# Patient Record
Sex: FEMALE | Race: BLACK OR AFRICAN AMERICAN | HISPANIC OR LATINO | Employment: FULL TIME | RURAL
[De-identification: names, ages, dates, MRNs, and addresses within clinical notes are randomized per-mention and may not be internally consistent; named-entity substitution may affect disease eponyms.]

---

## 2022-10-02 ENCOUNTER — HOSPITAL ENCOUNTER (EMERGENCY)
Facility: HOSPITAL | Age: 32
Discharge: HOME | End: 2022-10-02
Payer: COMMERCIAL

## 2022-10-02 VITALS
HEIGHT: 60 IN | OXYGEN SATURATION: 98 % | SYSTOLIC BLOOD PRESSURE: 135 MMHG | RESPIRATION RATE: 20 BRPM | TEMPERATURE: 98 F | HEART RATE: 69 BPM | BODY MASS INDEX: 27.12 KG/M2 | DIASTOLIC BLOOD PRESSURE: 86 MMHG | WEIGHT: 138.13 LBS

## 2022-10-02 DIAGNOSIS — R10.9 RIGHT FLANK PAIN: ICD-10-CM

## 2022-10-02 DIAGNOSIS — N20.0 KIDNEY STONE: Primary | ICD-10-CM

## 2022-10-02 DIAGNOSIS — N13.30 HYDRONEPHROSIS, UNSPECIFIED HYDRONEPHROSIS TYPE: ICD-10-CM

## 2022-10-02 LAB
ALBUMIN SERPL BCP-MCNC: 3.7 G/DL (ref 3.5–5)
ALBUMIN/GLOB SERPL: 1.2 {RATIO}
ALP SERPL-CCNC: 108 U/L (ref 37–98)
ALT SERPL W P-5'-P-CCNC: 33 U/L (ref 13–56)
AMYLASE SERPL-CCNC: 65 U/L (ref 25–115)
ANION GAP SERPL CALCULATED.3IONS-SCNC: 14 MMOL/L (ref 7–16)
AST SERPL W P-5'-P-CCNC: 21 U/L (ref 15–37)
BASOPHILS # BLD AUTO: 0.04 K/UL (ref 0–0.2)
BASOPHILS NFR BLD AUTO: 0.3 % (ref 0–1)
BILIRUB SERPL-MCNC: 0.3 MG/DL (ref ?–1.2)
BILIRUB UR QL STRIP: NEGATIVE
BUN SERPL-MCNC: 9 MG/DL (ref 7–18)
BUN/CREAT SERPL: 11 (ref 6–20)
CALCIUM SERPL-MCNC: 8.2 MG/DL (ref 8.5–10.1)
CHLORIDE SERPL-SCNC: 102 MMOL/L (ref 98–107)
CLARITY UR: CLEAR
CO2 SERPL-SCNC: 27 MMOL/L (ref 21–32)
COLOR UR: YELLOW
CREAT SERPL-MCNC: 0.83 MG/DL (ref 0.55–1.02)
DIFFERENTIAL METHOD BLD: ABNORMAL
EGFR (NO RACE VARIABLE) (RUSH/TITUS): 97 ML/MIN/1.73M²
EOSINOPHIL # BLD AUTO: 0.12 K/UL (ref 0–0.5)
EOSINOPHIL NFR BLD AUTO: 0.9 % (ref 1–4)
ERYTHROCYTE [DISTWIDTH] IN BLOOD BY AUTOMATED COUNT: 11.8 % (ref 11.5–14.5)
ETHANOL, BLOOD (CATEGORY): NOT DETECTED
GLOBULIN SER-MCNC: 3.2 G/DL (ref 2–4)
GLUCOSE SERPL-MCNC: 113 MG/DL (ref 74–106)
GLUCOSE UR STRIP-MCNC: NEGATIVE MG/DL
HCG UR QL IA.RAPID: NEGATIVE
HCT VFR BLD AUTO: 40.2 % (ref 38–47)
HGB BLD-MCNC: 13.9 G/DL (ref 12–16)
KETONES UR STRIP-SCNC: NEGATIVE MG/DL
LEUKOCYTE ESTERASE UR QL STRIP: ABNORMAL
LIPASE SERPL-CCNC: 100 U/L (ref 73–393)
LYMPHOCYTES # BLD AUTO: 2.53 K/UL (ref 1–4.8)
LYMPHOCYTES NFR BLD AUTO: 18.9 % (ref 27–41)
MCH RBC QN AUTO: 32.3 PG (ref 27–31)
MCHC RBC AUTO-ENTMCNC: 34.6 G/DL (ref 32–36)
MCV RBC AUTO: 93.5 FL (ref 80–96)
MONOCYTES # BLD AUTO: 1.12 K/UL (ref 0–0.8)
MONOCYTES NFR BLD AUTO: 8.4 % (ref 2–6)
MPC BLD CALC-MCNC: 8.9 FL (ref 9.4–12.4)
NEUTROPHILS # BLD AUTO: 9.58 K/UL (ref 1.8–7.7)
NEUTROPHILS NFR BLD AUTO: 71.5 % (ref 53–65)
NITRITE UR QL STRIP: NEGATIVE
PH UR STRIP: 7 PH UNITS
PLATELET # BLD AUTO: 357 K/UL (ref 150–400)
POTASSIUM SERPL-SCNC: 3.6 MMOL/L (ref 3.5–5.1)
PROT SERPL-MCNC: 6.9 G/DL (ref 6.4–8.2)
PROT UR QL STRIP: 30
RBC # BLD AUTO: 4.3 M/UL (ref 4.2–5.4)
RBC # UR STRIP: ABNORMAL /UL
RBC #/AREA URNS HPF: ABNORMAL /HPF
SODIUM SERPL-SCNC: 139 MMOL/L (ref 136–145)
SP GR UR STRIP: 1.02
UROBILINOGEN UR STRIP-ACNC: 0.2 MG/DL
WBC # BLD AUTO: 13.39 K/UL (ref 4.5–11)
WBC #/AREA URNS HPF: ABNORMAL /HPF

## 2022-10-02 PROCEDURE — 36415 COLL VENOUS BLD VENIPUNCTURE: CPT

## 2022-10-02 PROCEDURE — 99284 EMERGENCY DEPT VISIT MOD MDM: CPT | Mod: ,,,

## 2022-10-02 PROCEDURE — 82077 ASSAY SPEC XCP UR&BREATH IA: CPT

## 2022-10-02 PROCEDURE — 96374 THER/PROPH/DIAG INJ IV PUSH: CPT

## 2022-10-02 PROCEDURE — 99284 PR EMERGENCY DEPT VISIT,LEVEL IV: ICD-10-PCS | Mod: ,,,

## 2022-10-02 PROCEDURE — 82150 ASSAY OF AMYLASE: CPT

## 2022-10-02 PROCEDURE — 85025 COMPLETE CBC W/AUTO DIFF WBC: CPT

## 2022-10-02 PROCEDURE — 80053 COMPREHEN METABOLIC PANEL: CPT

## 2022-10-02 PROCEDURE — 81025 URINE PREGNANCY TEST: CPT

## 2022-10-02 PROCEDURE — 25000003 PHARM REV CODE 250

## 2022-10-02 PROCEDURE — 96375 TX/PRO/DX INJ NEW DRUG ADDON: CPT

## 2022-10-02 PROCEDURE — 81001 URINALYSIS AUTO W/SCOPE: CPT

## 2022-10-02 PROCEDURE — 83690 ASSAY OF LIPASE: CPT

## 2022-10-02 PROCEDURE — 63600175 PHARM REV CODE 636 W HCPCS

## 2022-10-02 PROCEDURE — 99285 EMERGENCY DEPT VISIT HI MDM: CPT | Mod: 25

## 2022-10-02 RX ORDER — HYDROCODONE BITARTRATE AND ACETAMINOPHEN 5; 325 MG/1; MG/1
1 TABLET ORAL EVERY 4 HOURS PRN
Qty: 8 TABLET | Refills: 0 | Status: SHIPPED | OUTPATIENT
Start: 2022-10-02

## 2022-10-02 RX ORDER — KETOROLAC TROMETHAMINE 30 MG/ML
30 INJECTION, SOLUTION INTRAMUSCULAR; INTRAVENOUS ONCE
Status: COMPLETED | OUTPATIENT
Start: 2022-10-02 | End: 2022-10-02

## 2022-10-02 RX ORDER — KETOROLAC TROMETHAMINE 10 MG/1
10 TABLET, FILM COATED ORAL EVERY 6 HOURS
Qty: 20 TABLET | Refills: 0 | Status: SHIPPED | OUTPATIENT
Start: 2022-10-02 | End: 2022-10-07

## 2022-10-02 RX ORDER — ONDANSETRON 2 MG/ML
4 INJECTION INTRAMUSCULAR; INTRAVENOUS ONCE
Status: COMPLETED | OUTPATIENT
Start: 2022-10-02 | End: 2022-10-02

## 2022-10-02 RX ORDER — TAMSULOSIN HYDROCHLORIDE 0.4 MG/1
0.4 CAPSULE ORAL DAILY
Qty: 10 CAPSULE | Refills: 0 | Status: SHIPPED | OUTPATIENT
Start: 2022-10-02 | End: 2022-10-12

## 2022-10-02 RX ORDER — ONDANSETRON 4 MG/1
4 TABLET, FILM COATED ORAL EVERY 6 HOURS
Qty: 12 TABLET | Refills: 0 | Status: SHIPPED | OUTPATIENT
Start: 2022-10-02

## 2022-10-02 RX ADMIN — KETOROLAC TROMETHAMINE 30 MG: 30 INJECTION, SOLUTION INTRAMUSCULAR at 11:10

## 2022-10-02 RX ADMIN — ONDANSETRON 4 MG: 2 INJECTION INTRAMUSCULAR; INTRAVENOUS at 11:10

## 2022-10-02 RX ADMIN — SODIUM CHLORIDE 1000 ML: 9 INJECTION, SOLUTION INTRAVENOUS at 11:10

## 2022-10-02 NOTE — Clinical Note
"Suemills "Lacey" Mayo was seen and treated in our emergency department on 10/2/2022.  She may return to work on 10/03/2022.       If you have any questions or concerns, please don't hesitate to call.      CHARLOTTE Cartagena"

## 2022-10-02 NOTE — DISCHARGE INSTRUCTIONS
Take medication as directed, drink plenty of water, follow-up PCP as needed, and return to the ER if experiencing chest pain shortness of breath.

## 2022-10-02 NOTE — ED PROVIDER NOTES
Encounter Date: 10/2/2022       History     Chief Complaint   Patient presents with    Flank Pain     Patient is a 33 y/o  female who presents to the Emergency Department with c/o right sided flank pain that started this morning upon awaking. Patient denies any nausea, vomiting, and/or diarrhea..    The history is provided by the patient.   Flank Pain  This is a new problem. The current episode started 1 to 2 hours ago. The problem occurs constantly. The problem has not changed since onset.Associated symptoms include abdominal pain. Pertinent negatives include no chest pain, no headaches and no shortness of breath. The symptoms are aggravated by walking, twisting and exertion. Nothing relieves the symptoms. She has tried nothing for the symptoms. The treatment provided no relief.   Review of patient's allergies indicates:  No Known Allergies  History reviewed. No pertinent past medical history.  History reviewed. No pertinent surgical history.  History reviewed. No pertinent family history.  Social History     Tobacco Use    Smoking status: Some Days     Types: Cigarettes     Passive exposure: Current    Smokeless tobacco: Never   Substance Use Topics    Alcohol use: Yes    Drug use: Never     Review of Systems   Constitutional: Negative.    HENT: Negative.     Eyes: Negative.  Negative for discharge.   Respiratory:  Negative for apnea, cough, choking, chest tightness, shortness of breath, wheezing and stridor.    Cardiovascular:  Negative for chest pain.   Gastrointestinal:  Positive for abdominal pain.   Endocrine: Negative.    Genitourinary:  Positive for flank pain.   Skin: Negative.    Allergic/Immunologic: Negative.    Neurological: Negative.  Negative for headaches.   Hematological: Negative.    Psychiatric/Behavioral: Negative.       Physical Exam     Initial Vitals [10/02/22 1107]   BP Pulse Resp Temp SpO2   135/86 69 20 98 °F (36.7 °C) 98 %      MAP       --         Physical Exam    Constitutional:  She appears well-developed and well-nourished. She is not diaphoretic. No distress.   HENT:   Head: Normocephalic and atraumatic.   Right Ear: External ear normal.   Left Ear: External ear normal.   Nose: Nose normal.   Mouth/Throat: Oropharynx is clear and moist. No oropharyngeal exudate.   Eyes: Conjunctivae and EOM are normal. Pupils are equal, round, and reactive to light. Right eye exhibits no discharge. Left eye exhibits no discharge. No scleral icterus.   Neck: Neck supple. No thyromegaly present. No tracheal deviation present. No JVD present.   Normal range of motion.  Cardiovascular:  Normal rate, regular rhythm, normal heart sounds and intact distal pulses.     Exam reveals no gallop and no friction rub.       No murmur heard.  Pulmonary/Chest: Breath sounds normal. No stridor. No respiratory distress. She has no wheezes. She has no rhonchi. She has no rales. She exhibits no tenderness.   Abdominal: Abdomen is soft. Bowel sounds are normal. She exhibits no distension and no mass. There is abdominal tenderness. There is no rebound and no guarding.   Musculoskeletal:         General: No tenderness or edema. Normal range of motion.      Cervical back: Normal range of motion and neck supple.     Lymphadenopathy:     She has no cervical adenopathy.   Neurological: She is alert and oriented to person, place, and time. She has normal strength and normal reflexes. She displays normal reflexes. No cranial nerve deficit or sensory deficit. GCS score is 15. GCS eye subscore is 4. GCS verbal subscore is 5. GCS motor subscore is 6.   Skin: Skin is warm and dry. Capillary refill takes less than 2 seconds. No rash and no abscess noted. No erythema. No pallor.   Psychiatric: She has a normal mood and affect. Her behavior is normal. Judgment and thought content normal.       Medical Screening Exam   See Full Note    ED Course   Procedures  Labs Reviewed   COMPREHENSIVE METABOLIC PANEL - Abnormal; Notable for the following  components:       Result Value    Glucose 113 (*)     Calcium 8.2 (*)     Alk Phos 108 (*)     All other components within normal limits   URINALYSIS, REFLEX TO URINE CULTURE - Abnormal; Notable for the following components:    Leukocytes, UA Trace (*)     Protein, UA 30 (*)     Blood, UA Large (*)     All other components within normal limits   CBC WITH DIFFERENTIAL - Abnormal; Notable for the following components:    WBC 13.39 (*)     MCH 32.3 (*)     MPV 8.9 (*)     Neutrophils % 71.5 (*)     Lymphocytes % 18.9 (*)     Neutrophils, Abs 9.58 (*)     Monocytes % 8.4 (*)     Eosinophils % 0.9 (*)     Monocytes, Absolute 1.12 (*)     All other components within normal limits   URINALYSIS, MICROSCOPIC - Abnormal; Notable for the following components:    RBC, UA Too Numerous To Count (*)     All other components within normal limits   HCG QUALITATIVE URINE - Normal   LIPASE - Normal   AMYLASE - Normal   CBC W/ AUTO DIFFERENTIAL    Narrative:     The following orders were created for panel order CBC auto differential.  Procedure                               Abnormality         Status                     ---------                               -----------         ------                     CBC with Differential[[id number]]        Abnormal            Final result                 Please view results for these tests on the individual orders.   ALCOHOL,MEDICAL (ETHANOL)          Imaging Results              CT Renal Stone Study ABD Pelvis WO (Final result)  Result time 10/02/22 12:14:41      Final result by Massimo Marques II, MD (10/02/22 12:14:41)                   Impression:      Mild-to-moderate right hydronephrosis with 2 mm calculus at right ureterovesical junction.      Electronically signed by: Massimo Marques  Date:    10/02/2022  Time:    12:14               Narrative:    EXAMINATION:  CT RENAL STONE STUDY ABD PELVIS WO    CLINICAL HISTORY:  Flank pain, kidney stone suspected;    TECHNIQUE:  Axial CT imaging of  the abdomen and pelvis is performed without contrast.    CT dose reduction technique used - Dose Rite and tube current modulation.    COMPARISON:  None available    FINDINGS:  Cardiac and lung bases are within normal limits    CT abdomen: The liver, spleen, pancreas and adrenal glands are normal in size and density.  No evidence of focal lesion is demonstrated in these solid organs.    There is mild moderate right hydronephrosis and hydroureter extending into the pelvis.  There is a calculus at right ureterovesical junction estimated 2 mm.  Left kidney appears normal.    The bowel caliber is normal and no wall thickening or adjacent inflammatory change is seen.  No evidence of free fluid or free air is present.  Appendix is normal.    CT pelvis: The bowel and bladder appear within normal limits.  The pelvic organs show no evidence of abnormality                                       X-Ray Abdomen AP 1 View (KUB) (Final result)  Result time 10/02/22 11:48:51      Final result by Massimo Marques II, MD (10/02/22 11:48:51)                   Impression:      No evidence of abnormality demonstrated      Electronically signed by: Massimo Marques  Date:    10/02/2022  Time:    11:48               Narrative:    EXAMINATION:  XR ABDOMEN AP 1 VIEW    CLINICAL HISTORY:  Abdominal pain    COMPARISON:  None available    FINDINGS:  No free fluid or free air seen.  The bowel gas pattern appears within normal limits.  No abnormal calcifications are present.  No other abnormality is identified.                                       Medications   ketorolac injection 30 mg (30 mg Intravenous Given 10/2/22 1123)   ondansetron injection 4 mg (4 mg Intravenous Given 10/2/22 1124)   sodium chloride 0.9% bolus 1,000 mL (1,000 mLs Intravenous New Bag 10/2/22 1124)                 ED Course as of 10/02/22 1231   Sun Oct 02, 2022   1219 Lab results as well as CT image/report reviewed by me and discussed with patient discharge structures  given along with strict return precautions patient and son verbalized understanding. [AC]      ED Course User Index  [AC] CHARLOTTE Cartagena          Clinical Impression:   Final diagnoses:  [R10.9] Right flank pain  [N20.0] Kidney stone (Primary)  [N13.30] Hydronephrosis, unspecified hydronephrosis type        ED Disposition Condition    Discharge Stable          ED Prescriptions       Medication Sig Dispense Start Date End Date Auth. Provider    ondansetron (ZOFRAN) 4 MG tablet Take 1 tablet (4 mg total) by mouth every 6 (six) hours. 12 tablet 10/2/2022 -- CHARLOTTE Cartagena    tamsulosin (FLOMAX) 0.4 mg Cap Take 1 capsule (0.4 mg total) by mouth once daily. for 10 days 10 capsule 10/2/2022 10/12/2022 CHARLOTTE Cartagena    HYDROcodone-acetaminophen (NORCO) 5-325 mg per tablet Take 1 tablet by mouth every 4 (four) hours as needed for Pain. 8 tablet 10/2/2022 -- CHARLOTTE Cartagena    ketorolac (TORADOL) 10 mg tablet Take 1 tablet (10 mg total) by mouth every 6 (six) hours. for 5 days 20 tablet 10/2/2022 10/7/2022 CHARLOTTE Cartagena          Follow-up Information       Follow up With Specialties Details Why Contact Info    local pcp   As needed, If symptoms worsen              CHARLOTTE Cartagena  10/02/22 1225       CHARLOTTE Cartagena  10/02/22 1225       CHARLOTTE Cartagena  10/02/22 1231

## 2025-03-02 ENCOUNTER — ANESTHESIA (OUTPATIENT)
Dept: SURGERY | Facility: HOSPITAL | Age: 35
End: 2025-03-02
Payer: COMMERCIAL

## 2025-03-02 ENCOUNTER — HOSPITAL ENCOUNTER (EMERGENCY)
Facility: HOSPITAL | Age: 35
Discharge: OTHER INSTITUTION | End: 2025-03-02
Payer: COMMERCIAL

## 2025-03-02 ENCOUNTER — HOSPITAL ENCOUNTER (OUTPATIENT)
Facility: HOSPITAL | Age: 35
Discharge: HOME | End: 2025-03-04
Attending: EMERGENCY MEDICINE | Admitting: SURGERY
Payer: COMMERCIAL

## 2025-03-02 ENCOUNTER — ANESTHESIA EVENT (OUTPATIENT)
Dept: SURGERY | Facility: HOSPITAL | Age: 35
End: 2025-03-02
Payer: COMMERCIAL

## 2025-03-02 VITALS
WEIGHT: 148.13 LBS | HEIGHT: 60 IN | SYSTOLIC BLOOD PRESSURE: 119 MMHG | BODY MASS INDEX: 29.08 KG/M2 | TEMPERATURE: 98 F | RESPIRATION RATE: 16 BRPM | DIASTOLIC BLOOD PRESSURE: 83 MMHG | HEART RATE: 72 BPM | OXYGEN SATURATION: 99 %

## 2025-03-02 DIAGNOSIS — K35.80 ACUTE APPENDICITIS, UNSPECIFIED ACUTE APPENDICITIS TYPE: Primary | ICD-10-CM

## 2025-03-02 DIAGNOSIS — K35.30 ACUTE APPENDICITIS WITH LOCALIZED PERITONITIS, UNSPECIFIED WHETHER ABSCESS PRESENT, UNSPECIFIED WHETHER GANGRENE PRESENT, UNSPECIFIED WHETHER PERFORATION PRESENT: Primary | ICD-10-CM

## 2025-03-02 DIAGNOSIS — K35.80 ACUTE APPENDICITIS, UNSPECIFIED ACUTE APPENDICITIS TYPE: ICD-10-CM

## 2025-03-02 LAB
ALBUMIN SERPL BCP-MCNC: 3.9 G/DL (ref 3.5–5)
ALBUMIN/GLOB SERPL: 1.6 {RATIO}
ALP SERPL-CCNC: 72 U/L (ref 40–150)
ALT SERPL W P-5'-P-CCNC: 18 U/L
ANION GAP SERPL CALCULATED.3IONS-SCNC: 13 MMOL/L (ref 7–16)
AST SERPL W P-5'-P-CCNC: 38 U/L (ref 5–34)
BACTERIA #/AREA URNS HPF: ABNORMAL /HPF
BASOPHILS # BLD AUTO: 0.02 K/UL (ref 0–0.2)
BASOPHILS NFR BLD AUTO: 0.1 % (ref 0–1)
BILIRUB SERPL-MCNC: 0.5 MG/DL
BILIRUB UR QL STRIP: NEGATIVE
BUN SERPL-MCNC: 10 MG/DL (ref 7–19)
BUN/CREAT SERPL: 15 (ref 6–20)
CALCIUM SERPL-MCNC: 8.8 MG/DL (ref 8.4–10.2)
CHLORIDE SERPL-SCNC: 103 MMOL/L (ref 98–107)
CLARITY UR: CLEAR
CO2 SERPL-SCNC: 21 MMOL/L (ref 22–29)
COLOR UR: YELLOW
CREAT SERPL-MCNC: 0.65 MG/DL (ref 0.55–1.02)
DIFFERENTIAL METHOD BLD: ABNORMAL
EGFR (NO RACE VARIABLE) (RUSH/TITUS): 120 ML/MIN/1.73M2
EOSINOPHIL # BLD AUTO: 0.01 K/UL (ref 0–0.5)
EOSINOPHIL NFR BLD AUTO: 0.1 % (ref 1–4)
ERYTHROCYTE [DISTWIDTH] IN BLOOD BY AUTOMATED COUNT: 11.2 % (ref 11.5–14.5)
GLOBULIN SER-MCNC: 2.5 G/DL (ref 2–4)
GLUCOSE SERPL-MCNC: 103 MG/DL (ref 74–100)
GLUCOSE SERPL-MCNC: 126 MG/DL (ref 70–105)
GLUCOSE UR STRIP-MCNC: NEGATIVE MG/DL
HCG UR QL IA.RAPID: NEGATIVE
HCT VFR BLD AUTO: 39.3 % (ref 38–47)
HGB BLD-MCNC: 13 G/DL (ref 12–16)
INFLUENZA A MOLECULAR (OHS): NEGATIVE
INFLUENZA B MOLECULAR (OHS): NEGATIVE
KETONES UR STRIP-SCNC: NEGATIVE MG/DL
LACTATE SERPL-SCNC: 2.1 MMOL/L (ref 0.5–2.2)
LEUKOCYTE ESTERASE UR QL STRIP: NEGATIVE
LIPASE SERPL-CCNC: 20 U/L
LYMPHOCYTES # BLD AUTO: 1.06 K/UL (ref 1–4.8)
LYMPHOCYTES NFR BLD AUTO: 5.4 % (ref 27–41)
MCH RBC QN AUTO: 31.1 PG (ref 27–31)
MCHC RBC AUTO-ENTMCNC: 33.1 G/DL (ref 32–36)
MCV RBC AUTO: 94 FL (ref 80–96)
MONOCYTES # BLD AUTO: 0.84 K/UL (ref 0–0.8)
MONOCYTES NFR BLD AUTO: 4.3 % (ref 2–6)
MPC BLD CALC-MCNC: 8.9 FL (ref 9.4–12.4)
MUCOUS THREADS #/AREA URNS HPF: ABNORMAL /HPF
NEUTROPHILS # BLD AUTO: 17.69 K/UL (ref 1.8–7.7)
NEUTROPHILS NFR BLD AUTO: 90.1 % (ref 53–65)
NITRITE UR QL STRIP: NEGATIVE
PH UR STRIP: 5.5 PH UNITS
PLATELET # BLD AUTO: 329 K/UL (ref 150–400)
POTASSIUM SERPL-SCNC: 3.3 MMOL/L (ref 3.5–5.1)
PROT SERPL-MCNC: 6.4 G/DL (ref 6.4–8.3)
PROT UR QL STRIP: NEGATIVE
RBC # BLD AUTO: 4.18 M/UL (ref 4.2–5.4)
RBC # UR STRIP: ABNORMAL /UL
RBC #/AREA URNS HPF: ABNORMAL /HPF
SARS-COV-2 RDRP RESP QL NAA+PROBE: NEGATIVE
SODIUM SERPL-SCNC: 134 MMOL/L (ref 136–145)
SP GR UR STRIP: >=1.03
SQUAMOUS #/AREA URNS LPF: ABNORMAL /LPF
UROBILINOGEN UR STRIP-ACNC: 0.2 MG/DL
WBC # BLD AUTO: 19.62 K/UL (ref 4.5–11)
WBC #/AREA URNS HPF: ABNORMAL /HPF

## 2025-03-02 PROCEDURE — 27000716 HC OXISENSOR PROBE, ANY SIZE: Performed by: ANESTHESIOLOGY

## 2025-03-02 PROCEDURE — 25000003 PHARM REV CODE 250: Performed by: NURSE ANESTHETIST, CERTIFIED REGISTERED

## 2025-03-02 PROCEDURE — 88304 TISSUE EXAM BY PATHOLOGIST: CPT | Mod: TC,SUR | Performed by: SURGERY

## 2025-03-02 PROCEDURE — 63600175 PHARM REV CODE 636 W HCPCS: Performed by: SURGERY

## 2025-03-02 PROCEDURE — 99285 EMERGENCY DEPT VISIT HI MDM: CPT | Mod: 25

## 2025-03-02 PROCEDURE — D9220A PRA ANESTHESIA: Mod: ANES,,, | Performed by: ANESTHESIOLOGY

## 2025-03-02 PROCEDURE — 82962 GLUCOSE BLOOD TEST: CPT

## 2025-03-02 PROCEDURE — 36000708 HC OR TIME LEV III 1ST 15 MIN: Performed by: SURGERY

## 2025-03-02 PROCEDURE — 99285 EMERGENCY DEPT VISIT HI MDM: CPT | Mod: ,,, | Performed by: NURSE PRACTITIONER

## 2025-03-02 PROCEDURE — 37000008 HC ANESTHESIA 1ST 15 MINUTES: Performed by: SURGERY

## 2025-03-02 PROCEDURE — 63600175 PHARM REV CODE 636 W HCPCS: Mod: JZ,TB | Performed by: NURSE PRACTITIONER

## 2025-03-02 PROCEDURE — 83690 ASSAY OF LIPASE: CPT | Performed by: NURSE PRACTITIONER

## 2025-03-02 PROCEDURE — 63600175 PHARM REV CODE 636 W HCPCS: Performed by: NURSE ANESTHETIST, CERTIFIED REGISTERED

## 2025-03-02 PROCEDURE — 27201423 OPTIME MED/SURG SUP & DEVICES STERILE SUPPLY: Performed by: SURGERY

## 2025-03-02 PROCEDURE — 96361 HYDRATE IV INFUSION ADD-ON: CPT

## 2025-03-02 PROCEDURE — 88304 TISSUE EXAM BY PATHOLOGIST: CPT | Mod: 26,,, | Performed by: PATHOLOGY

## 2025-03-02 PROCEDURE — 87635 SARS-COV-2 COVID-19 AMP PRB: CPT | Performed by: NURSE PRACTITIONER

## 2025-03-02 PROCEDURE — D9220A PRA ANESTHESIA: Mod: CRNA,,, | Performed by: NURSE ANESTHETIST, CERTIFIED REGISTERED

## 2025-03-02 PROCEDURE — 96374 THER/PROPH/DIAG INJ IV PUSH: CPT

## 2025-03-02 PROCEDURE — 44970 LAPAROSCOPY APPENDECTOMY: CPT | Mod: ,,, | Performed by: SURGERY

## 2025-03-02 PROCEDURE — 80053 COMPREHEN METABOLIC PANEL: CPT | Performed by: NURSE PRACTITIONER

## 2025-03-02 PROCEDURE — 36000709 HC OR TIME LEV III EA ADD 15 MIN: Performed by: SURGERY

## 2025-03-02 PROCEDURE — 27000689 HC BLADE LARYNGOSCOPE ANY SIZE: Performed by: ANESTHESIOLOGY

## 2025-03-02 PROCEDURE — 27000655: Performed by: ANESTHESIOLOGY

## 2025-03-02 PROCEDURE — 25500020 PHARM REV CODE 255: Performed by: NURSE PRACTITIONER

## 2025-03-02 PROCEDURE — 81003 URINALYSIS AUTO W/O SCOPE: CPT | Performed by: NURSE PRACTITIONER

## 2025-03-02 PROCEDURE — 27000165 HC TUBE, ETT CUFFED: Performed by: ANESTHESIOLOGY

## 2025-03-02 PROCEDURE — 96375 TX/PRO/DX INJ NEW DRUG ADDON: CPT

## 2025-03-02 PROCEDURE — 25000003 PHARM REV CODE 250: Performed by: NURSE PRACTITIONER

## 2025-03-02 PROCEDURE — 71000033 HC RECOVERY, INTIAL HOUR: Performed by: SURGERY

## 2025-03-02 PROCEDURE — 37000009 HC ANESTHESIA EA ADD 15 MINS: Performed by: SURGERY

## 2025-03-02 PROCEDURE — 27000510 HC BLANKET BAIR HUGGER ANY SIZE: Performed by: ANESTHESIOLOGY

## 2025-03-02 PROCEDURE — 83605 ASSAY OF LACTIC ACID: CPT | Performed by: NURSE PRACTITIONER

## 2025-03-02 PROCEDURE — 85025 COMPLETE CBC W/AUTO DIFF WBC: CPT | Performed by: NURSE PRACTITIONER

## 2025-03-02 PROCEDURE — 87502 INFLUENZA DNA AMP PROBE: CPT | Performed by: NURSE PRACTITIONER

## 2025-03-02 PROCEDURE — 81025 URINE PREGNANCY TEST: CPT | Performed by: NURSE PRACTITIONER

## 2025-03-02 RX ORDER — ROCURONIUM BROMIDE 10 MG/ML
INJECTION, SOLUTION INTRAVENOUS
Status: DISCONTINUED | OUTPATIENT
Start: 2025-03-02 | End: 2025-03-02

## 2025-03-02 RX ORDER — LIDOCAINE HYDROCHLORIDE 20 MG/ML
INJECTION, SOLUTION EPIDURAL; INFILTRATION; INTRACAUDAL; PERINEURAL
Status: DISCONTINUED | OUTPATIENT
Start: 2025-03-02 | End: 2025-03-02

## 2025-03-02 RX ORDER — BUPIVACAINE HYDROCHLORIDE 2.5 MG/ML
INJECTION, SOLUTION EPIDURAL; INFILTRATION; INTRACAUDAL
Status: DISCONTINUED | OUTPATIENT
Start: 2025-03-02 | End: 2025-03-02 | Stop reason: HOSPADM

## 2025-03-02 RX ORDER — ONDANSETRON HYDROCHLORIDE 2 MG/ML
INJECTION, SOLUTION INTRAVENOUS
Status: DISCONTINUED | OUTPATIENT
Start: 2025-03-02 | End: 2025-03-02

## 2025-03-02 RX ORDER — HYDROMORPHONE HYDROCHLORIDE 1 MG/ML
1 INJECTION, SOLUTION INTRAMUSCULAR; INTRAVENOUS; SUBCUTANEOUS
Refills: 0 | Status: COMPLETED | OUTPATIENT
Start: 2025-03-02 | End: 2025-03-02

## 2025-03-02 RX ORDER — PHENYLEPHRINE HYDROCHLORIDE 10 MG/ML
INJECTION INTRAVENOUS
Status: DISCONTINUED | OUTPATIENT
Start: 2025-03-02 | End: 2025-03-02

## 2025-03-02 RX ORDER — MORPHINE SULFATE 10 MG/ML
4 INJECTION INTRAMUSCULAR; INTRAVENOUS; SUBCUTANEOUS EVERY 5 MIN PRN
Status: DISCONTINUED | OUTPATIENT
Start: 2025-03-02 | End: 2025-03-03 | Stop reason: HOSPADM

## 2025-03-02 RX ORDER — FENTANYL CITRATE 50 UG/ML
INJECTION, SOLUTION INTRAMUSCULAR; INTRAVENOUS
Status: DISCONTINUED | OUTPATIENT
Start: 2025-03-02 | End: 2025-03-02

## 2025-03-02 RX ORDER — IOPAMIDOL 755 MG/ML
100 INJECTION, SOLUTION INTRAVASCULAR
Status: COMPLETED | OUTPATIENT
Start: 2025-03-02 | End: 2025-03-02

## 2025-03-02 RX ORDER — DIPHENHYDRAMINE HYDROCHLORIDE 50 MG/ML
25 INJECTION INTRAMUSCULAR; INTRAVENOUS EVERY 6 HOURS PRN
Status: DISCONTINUED | OUTPATIENT
Start: 2025-03-02 | End: 2025-03-03 | Stop reason: HOSPADM

## 2025-03-02 RX ORDER — DEXAMETHASONE SODIUM PHOSPHATE 4 MG/ML
INJECTION, SOLUTION INTRA-ARTICULAR; INTRALESIONAL; INTRAMUSCULAR; INTRAVENOUS; SOFT TISSUE
Status: DISCONTINUED | OUTPATIENT
Start: 2025-03-02 | End: 2025-03-02

## 2025-03-02 RX ORDER — HYDROMORPHONE HYDROCHLORIDE 2 MG/ML
0.5 INJECTION, SOLUTION INTRAMUSCULAR; INTRAVENOUS; SUBCUTANEOUS EVERY 5 MIN PRN
Status: DISCONTINUED | OUTPATIENT
Start: 2025-03-02 | End: 2025-03-03 | Stop reason: HOSPADM

## 2025-03-02 RX ORDER — MIDAZOLAM HYDROCHLORIDE 1 MG/ML
INJECTION INTRAMUSCULAR; INTRAVENOUS
Status: DISCONTINUED | OUTPATIENT
Start: 2025-03-02 | End: 2025-03-02

## 2025-03-02 RX ORDER — SODIUM CHLORIDE, SODIUM LACTATE, POTASSIUM CHLORIDE, CALCIUM CHLORIDE 600; 310; 30; 20 MG/100ML; MG/100ML; MG/100ML; MG/100ML
INJECTION, SOLUTION INTRAVENOUS CONTINUOUS PRN
Status: DISCONTINUED | OUTPATIENT
Start: 2025-03-02 | End: 2025-03-02

## 2025-03-02 RX ORDER — MEPERIDINE HYDROCHLORIDE 25 MG/ML
25 INJECTION INTRAMUSCULAR; INTRAVENOUS; SUBCUTANEOUS EVERY 10 MIN PRN
Status: ACTIVE | OUTPATIENT
Start: 2025-03-02 | End: 2025-03-02

## 2025-03-02 RX ORDER — ONDANSETRON HYDROCHLORIDE 2 MG/ML
4 INJECTION, SOLUTION INTRAVENOUS DAILY PRN
Status: DISCONTINUED | OUTPATIENT
Start: 2025-03-02 | End: 2025-03-03 | Stop reason: HOSPADM

## 2025-03-02 RX ORDER — ONDANSETRON HYDROCHLORIDE 2 MG/ML
4 INJECTION, SOLUTION INTRAVENOUS
Status: COMPLETED | OUTPATIENT
Start: 2025-03-02 | End: 2025-03-02

## 2025-03-02 RX ORDER — HYDROMORPHONE HYDROCHLORIDE 2 MG/ML
INJECTION, SOLUTION INTRAMUSCULAR; INTRAVENOUS; SUBCUTANEOUS
Status: DISCONTINUED | OUTPATIENT
Start: 2025-03-02 | End: 2025-03-02

## 2025-03-02 RX ORDER — GLUCAGON 1 MG
1 KIT INJECTION
Status: DISCONTINUED | OUTPATIENT
Start: 2025-03-02 | End: 2025-03-03 | Stop reason: HOSPADM

## 2025-03-02 RX ORDER — PROPOFOL 10 MG/ML
VIAL (ML) INTRAVENOUS
Status: DISCONTINUED | OUTPATIENT
Start: 2025-03-02 | End: 2025-03-02

## 2025-03-02 RX ADMIN — DEXAMETHASONE SODIUM PHOSPHATE 8 MG: 4 INJECTION, SOLUTION INTRA-ARTICULAR; INTRALESIONAL; INTRAMUSCULAR; INTRAVENOUS; SOFT TISSUE at 10:03

## 2025-03-02 RX ADMIN — PHENYLEPHRINE HYDROCHLORIDE 100 MCG: 10 INJECTION INTRAVENOUS at 10:03

## 2025-03-02 RX ADMIN — MIDAZOLAM HYDROCHLORIDE 2 MG: 1 INJECTION, SOLUTION INTRAMUSCULAR; INTRAVENOUS at 10:03

## 2025-03-02 RX ADMIN — HYDROMORPHONE HYDROCHLORIDE 1 MG: 1 INJECTION, SOLUTION INTRAMUSCULAR; INTRAVENOUS; SUBCUTANEOUS at 05:03

## 2025-03-02 RX ADMIN — LIDOCAINE HYDROCHLORIDE 50 MG: 20 INJECTION, SOLUTION EPIDURAL; INFILTRATION; INTRACAUDAL; PERINEURAL at 10:03

## 2025-03-02 RX ADMIN — FENTANYL CITRATE 100 MCG: 50 INJECTION, SOLUTION INTRAMUSCULAR; INTRAVENOUS at 10:03

## 2025-03-02 RX ADMIN — PROPOFOL 150 MG: 10 INJECTION, EMULSION INTRAVENOUS at 10:03

## 2025-03-02 RX ADMIN — SUGAMMADEX 200 MG: 100 INJECTION, SOLUTION INTRAVENOUS at 11:03

## 2025-03-02 RX ADMIN — ONDANSETRON 4 MG: 2 INJECTION INTRAMUSCULAR; INTRAVENOUS at 10:03

## 2025-03-02 RX ADMIN — ONDANSETRON 4 MG: 2 INJECTION INTRAMUSCULAR; INTRAVENOUS at 05:03

## 2025-03-02 RX ADMIN — SODIUM CHLORIDE, POTASSIUM CHLORIDE, SODIUM LACTATE AND CALCIUM CHLORIDE: 600; 310; 30; 20 INJECTION, SOLUTION INTRAVENOUS at 10:03

## 2025-03-02 RX ADMIN — ROCURONIUM BROMIDE 50 MG: 10 INJECTION, SOLUTION INTRAVENOUS at 10:03

## 2025-03-02 RX ADMIN — HYDROMORPHONE HYDROCHLORIDE 1 MG: 2 INJECTION, SOLUTION INTRAMUSCULAR; INTRAVENOUS; SUBCUTANEOUS at 11:03

## 2025-03-02 RX ADMIN — SODIUM CHLORIDE 2 G: 9 INJECTION, SOLUTION INTRAVENOUS at 10:03

## 2025-03-02 RX ADMIN — IOPAMIDOL 100 ML: 755 INJECTION, SOLUTION INTRAVENOUS at 06:03

## 2025-03-02 RX ADMIN — SODIUM CHLORIDE 1000 ML: 9 INJECTION, SOLUTION INTRAVENOUS at 05:03

## 2025-03-02 NOTE — ED PROVIDER NOTES
Encounter Date: 3/2/2025       History     Chief Complaint   Patient presents with    Abdominal Pain    Flank Pain     right    Vomiting     X 5 today     35 y/o  female presents pov with c/o right flank pain, abdominal pain, and nausea/vomiting x 5 with onset this AM. Locates abdominal pain as generalized and rates pain 7/10. LMB 2 months ago and irregular due to birth control with Depo Provera. Had BM today with normal stool. Negative for fever/chills, diarrhea, or constipation.    The history is provided by the patient.     Review of patient's allergies indicates:  No Known Allergies  History reviewed. No pertinent past medical history.  History reviewed. No pertinent surgical history.  No family history on file.  Social History[1]  Review of Systems   Constitutional:  Negative for chills and fever.   Respiratory: Negative.  Negative for apnea, cough, choking, chest tightness, shortness of breath, wheezing and stridor.    Cardiovascular: Negative.  Negative for chest pain, palpitations and leg swelling.   Gastrointestinal:  Positive for abdominal pain, nausea and vomiting. Negative for abdominal distention, anal bleeding, blood in stool, constipation, diarrhea and rectal pain.   Genitourinary: Negative.    Musculoskeletal: Negative.    Skin: Negative.    Neurological: Negative.    Psychiatric/Behavioral: Negative.     All other systems reviewed and are negative.      Physical Exam     Initial Vitals [03/02/25 1605]   BP Pulse Resp Temp SpO2   131/85 82 20 98.3 °F (36.8 °C) 99 %      MAP       --         Physical Exam    Nursing note and vitals reviewed.  Constitutional: She appears well-developed and well-nourished. No distress.   HENT:   Head: Normocephalic.   Eyes: Conjunctivae and EOM are normal. No scleral icterus.   Cardiovascular:  Normal rate, regular rhythm, normal heart sounds and intact distal pulses.     Exam reveals no gallop and no friction rub.       No murmur heard.  Pulmonary/Chest: Breath  sounds normal. No respiratory distress. She has no wheezes. She has no rhonchi. She has no rales. She exhibits no tenderness.   Abdominal: Abdomen is soft. Bowel sounds are normal. She exhibits mass. She exhibits no distension. There is generalized abdominal tenderness. No hernia.   No right CVA tenderness.  No left CVA tenderness. There is tenderness at McBurney's point. There is no rebound, no guarding and negative Khanna's sign. negative psoas sign and negative Rovsing's sign  Musculoskeletal:         General: No tenderness. Normal range of motion.     Neurological: She is alert and oriented to person, place, and time. She has normal strength.   Skin: Skin is warm and dry. Capillary refill takes less than 2 seconds.   Psychiatric: She has a normal mood and affect. Her behavior is normal. Judgment and thought content normal.         Medical Screening Exam   See Full Note    ED Course   Procedures  Labs Reviewed   COMPREHENSIVE METABOLIC PANEL - Abnormal       Result Value    Sodium 134 (*)     Potassium 3.3 (*)     Chloride 103      CO2 21 (*)     Anion Gap 13      Glucose 103 (*)     BUN 10      Creatinine 0.65      BUN/Creatinine Ratio 15      Calcium 8.8      Total Protein 6.4      Albumin 3.9      Globulin 2.5      A/G Ratio 1.6      Bilirubin, Total 0.5      Alk Phos 72      ALT 18      AST 38 (*)     eGFR 120     URINALYSIS, REFLEX TO URINE CULTURE - Abnormal    Color, UA Yellow      Clarity, UA Clear      pH, UA 5.5      Leukocytes, UA Negative      Nitrites, UA Negative      Protein, UA Negative      Glucose, UA Negative      Ketones, UA Negative      Urobilinogen, UA 0.2      Bilirubin, UA Negative      Blood, UA Trace-Intact (*)     Specific Gravity, UA >=1.030 (*)    CBC WITH DIFFERENTIAL - Abnormal    WBC 19.62 (*)     RBC 4.18 (*)     Hemoglobin 13.0      Hematocrit 39.3      MCV 94.0      MCH 31.1 (*)     MCHC 33.1      RDW 11.2 (*)     Platelet Count 329      MPV 8.9 (*)     Neutrophils % 90.1 (*)      Lymphocytes % 5.4 (*)     Neutrophils, Abs 17.69 (*)     Lymphocytes, Absolute 1.06      Diff Type Auto      Monocytes % 4.3      Eosinophils % 0.1 (*)     Basophils % 0.1      Monocytes, Absolute 0.84 (*)     Eosinophils, Absolute 0.01      Basophils, Absolute 0.02     URINALYSIS, MICROSCOPIC - Abnormal    WBC, UA 0-5      RBC, UA 0-3      Bacteria, UA Few (*)     Squamous Epithelial Cells, UA Few (*)     Mucus, UA Few (*)    INFLUENZA A & B BY MOLECULAR - Normal    INFLUENZA A MOLECULAR Negative      INFLUENZA B MOLECULAR  Negative     LIPASE - Normal    Lipase 20     HCG QUALITATIVE URINE - Normal    HCG Qualitative, Urine Negative     SARS-COV-2 RNA AMPLIFICATION, QUAL - Normal    SARS COV-2 Molecular Negative      Narrative:     Negative SARS-CoV results should not be used as the sole basis for treatment or patient management decisions; negative results should be considered in the context of a patient's recent exposures, history and the presene of clinical signs and symptoms consistent with COVID-19.  Negative results should be treated as presumptive and confirmed by molecular assay, if necessary for patient management.   LACTIC ACID, PLASMA - Normal    Lactic Acid 2.1     CBC W/ AUTO DIFFERENTIAL    Narrative:     The following orders were created for panel order CBC auto differential.  Procedure                               Abnormality         Status                     ---------                               -----------         ------                     CBC with Differential[[id number]]        Abnormal            Final result               Manual Differential[[id number]]                                                          Please view results for these tests on the individual orders.   LACTIC ACID, PLASMA          Imaging Results               CT Abdomen Pelvis With IV Contrast NO Oral Contrast (Final result)  Result time 03/02/25 18:58:11      Final result by Jeff Elmore MD (03/02/25  18:58:11)                   Impression:      This report was flagged in Epic as abnormal.    1. There is fluid distention of the appendix without surrounding inflammation or focal fluid collection.  Early change of appendicitis not excluded.  Correlation is advised.  2. Hepatomegaly noting possible steatosis, correlation with LFTs recommended.  3. Prominent mesenteric venous vasculature, may contribute to pelvic congestion syndrome, clinical correlation needed.  4. Please see above for several additional findings.      Electronically signed by: Jeff Elmore MD  Date:    03/02/2025  Time:    18:58               Narrative:    EXAMINATION:  CT ABDOMEN PELVIS WITH IV CONTRAST    CLINICAL HISTORY:  Abdominal pain, acute, nonlocalized;    TECHNIQUE:  Low dose axial images, sagittal and coronal reformations were obtained from the lung bases to the pubic symphysis following the IV administration of 100 mL of Isovue 370 .  Oral contrast was not given.    COMPARISON:  CT 10/02/2022    FINDINGS:  Images of the lower thorax are remarkable for bilateral dependent atelectasis.    The liver is mildly hypoattenuating, possibly reflecting steatosis versus sequela of contrast phase.  Correlation with LFTs as warranted.  The liver is enlarged.  The spleen, pancreas, gallbladder and adrenal glands are grossly unremarkable.  The stomach is decompressed without wall thickening.  The portal vein, splenic vein, SMV, celiac axis and SMA all are patent.  No significant abdominal lymphadenopathy.    The kidneys enhance symmetrically without hydronephrosis or nephrolithiasis.  The bilateral ureters are unremarkable without calculi seen noting the ureters cannot be followed in their entirety to the urinary bladder.  No secondary findings to suggest obstructive uropathy.  The urinary bladder is decompressed.  The uterus is unremarkable.  There are several prominent vascular structures within the adnexa bilaterally.  The ovaries are  unremarkable.  There is a small amount of fluid in the pelvis.    The large bowel is unremarkable.  The terminal ileum is unremarkable.  The appendix is distended with fluid, luminal diameter measuring up to 1.1 cm noting distal appendicoliths.  The small bowel is grossly unremarkable.  There are a few scattered shotty periaortic, pericaval, and mesenteric lymph nodes.  No focal organized pelvic fluid collection.  No focal organized pelvic fluid collection.    No acute osseous abnormality.  No significant inguinal lymphadenopathy.                                       Medications   piperacillin-tazobactam (ZOSYN) 4.5 g in D5W 100 mL IVPB (MB+) (has no administration in time range)   HYDROmorphone injection 1 mg (1 mg Intravenous Given 3/2/25 1716)   ondansetron injection 4 mg (4 mg Intravenous Given 3/2/25 1716)   sodium chloride 0.9% bolus 1,000 mL 1,000 mL (1,000 mLs Intravenous New Bag 3/2/25 1755)   iopamidoL (ISOVUE-370) injection 100 mL (100 mLs Intravenous Given 3/2/25 1828)     Medical Decision Making  35 y/o  female presents pov with c/o right flank pain, abdominal pain, and nausea/vomiting x 5 with onset this AM. Locates abdominal pain as generalized and rates pain 7/10. LMB 2 months ago and irregular due to birth control with Depo Provera. Had BM today with normal stool. Negative for fever/chills, diarrhea, or constipation.    Amount and/or Complexity of Data Reviewed  Labs: ordered.     Details: CBC: WBC 19.62  CMP: Sodium 134, Potassium 3.3  Lipase: 20  UA: WNL  UPT: Negative  Covid: Negative  Flu: Negative  Radiology: ordered.     Details: CT Abd/Pelvis: 1. There is fluid distention of the appendix without surrounding inflammation or focal fluid collection.  Early change of appendicitis not excluded.  Correlation is advised.  2. Hepatomegaly noting possible steatosis, correlation with LFTs recommended.  3. Prominent mesenteric venous vasculature, may contribute to pelvic congestion syndrome,  clinical correlation needed.      Risk  Prescription drug management.               ED Course as of 03/02/25 1926   Sun Mar 02, 2025   1904 CT Abd/Pelvis: 1. There is fluid distention of the appendix without surrounding inflammation or focal fluid collection.  Early change of appendicitis not excluded.  Correlation is advised.  2. Hepatomegaly noting possible steatosis, correlation with LFTs recommended.  3. Prominent mesenteric venous vasculature, may contribute to pelvic congestion syndrome, clinical correlation needed. [NJ]   1904 Transfer order for general surgery consult placed [NJ]   1915 Received call back from [facility] and connected with Dr. Albrecht general surgeon. Patient case discussed and Dr. Albrecht reviewed CT scan.  agrees with plan to transfer. Connected with dr. Ro [facility] ED and patient case discussed. Dr. Ro agrees to accept patient transfer Ed-to-ED. [NJ]      ED Course User Index  [NJ] Yobani Delatorre FNP                           Clinical Impression:   Final diagnoses:  [K35.80] Acute appendicitis, unspecified acute appendicitis type (Primary)        ED Disposition Condition    Transfer to Another Facility Stable                  [1]   Social History  Tobacco Use    Smoking status: Some Days     Types: Cigarettes     Passive exposure: Current    Smokeless tobacco: Never   Substance Use Topics    Alcohol use: Not Currently    Drug use: Never        Yobani Delatorre FNP  03/02/25 1926

## 2025-03-02 NOTE — Clinical Note
March 3, 2025    Lacey Huber  [address] [place] [address]                   [address] [place] [address] [place] [address]  Phone: [phone]  Fax: [phone]   March 3, 2025     Patient: Lacey Huber   YOB: 1990   Date of Visit: 3/2/2025       To Whom it May Concern:    Lacey Huber was seen in my clinic on 3/2/2025. She {Return to school/sport/work:09075}.    Please excuse her from any classes or work missed.    If you have any questions or concerns, please don't hesitate to call.    Sincerely,         Be Mckeon RN

## 2025-03-03 PROCEDURE — 25000003 PHARM REV CODE 250: Performed by: SURGERY

## 2025-03-03 PROCEDURE — S5010 5% DEXTROSE AND 0.45% SALINE: HCPCS | Performed by: SURGERY

## 2025-03-03 PROCEDURE — 63600175 PHARM REV CODE 636 W HCPCS: Performed by: SURGERY

## 2025-03-03 RX ORDER — DEXTROSE MONOHYDRATE AND SODIUM CHLORIDE 5; .45 G/100ML; G/100ML
INJECTION, SOLUTION INTRAVENOUS CONTINUOUS
Status: DISCONTINUED | OUTPATIENT
Start: 2025-03-03 | End: 2025-03-04 | Stop reason: HOSPADM

## 2025-03-03 RX ORDER — TALC
6 POWDER (GRAM) TOPICAL NIGHTLY PRN
Status: DISCONTINUED | OUTPATIENT
Start: 2025-03-03 | End: 2025-03-04 | Stop reason: HOSPADM

## 2025-03-03 RX ORDER — ENOXAPARIN SODIUM 100 MG/ML
40 INJECTION SUBCUTANEOUS EVERY 24 HOURS
Status: DISCONTINUED | OUTPATIENT
Start: 2025-03-03 | End: 2025-03-04 | Stop reason: HOSPADM

## 2025-03-03 RX ORDER — CLONIDINE HYDROCHLORIDE 0.1 MG/1
0.1 TABLET ORAL EVERY 6 HOURS PRN
Status: DISCONTINUED | OUTPATIENT
Start: 2025-03-03 | End: 2025-03-04 | Stop reason: HOSPADM

## 2025-03-03 RX ORDER — ONDANSETRON HYDROCHLORIDE 2 MG/ML
4 INJECTION, SOLUTION INTRAVENOUS EVERY 8 HOURS PRN
Status: DISCONTINUED | OUTPATIENT
Start: 2025-03-03 | End: 2025-03-04 | Stop reason: HOSPADM

## 2025-03-03 RX ORDER — MORPHINE SULFATE 4 MG/ML
4 INJECTION, SOLUTION INTRAMUSCULAR; INTRAVENOUS
Status: DISCONTINUED | OUTPATIENT
Start: 2025-03-03 | End: 2025-03-04 | Stop reason: HOSPADM

## 2025-03-03 RX ORDER — ACETAMINOPHEN 325 MG/1
650 TABLET ORAL EVERY 6 HOURS PRN
Status: DISCONTINUED | OUTPATIENT
Start: 2025-03-03 | End: 2025-03-04 | Stop reason: HOSPADM

## 2025-03-03 RX ORDER — HYDROCODONE BITARTRATE AND ACETAMINOPHEN 7.5; 325 MG/1; MG/1
1 TABLET ORAL EVERY 6 HOURS PRN
Status: DISCONTINUED | OUTPATIENT
Start: 2025-03-03 | End: 2025-03-04 | Stop reason: HOSPADM

## 2025-03-03 RX ORDER — MORPHINE SULFATE 4 MG/ML
4 INJECTION, SOLUTION INTRAMUSCULAR; INTRAVENOUS EVERY 4 HOURS PRN
Status: DISCONTINUED | OUTPATIENT
Start: 2025-03-03 | End: 2025-03-04 | Stop reason: HOSPADM

## 2025-03-03 RX ADMIN — DEXTROSE AND SODIUM CHLORIDE: 5; 450 INJECTION, SOLUTION INTRAVENOUS at 02:03

## 2025-03-03 RX ADMIN — ENOXAPARIN SODIUM 40 MG: 40 INJECTION SUBCUTANEOUS at 04:03

## 2025-03-03 RX ADMIN — HYDROCODONE BITARTRATE AND ACETAMINOPHEN 1 TABLET: 7.5; 325 TABLET ORAL at 09:03

## 2025-03-03 NOTE — H&P
[facility] - Emergency Department  General Surgery  History & Physical    Patient Name: Lacey Huber  MRN: [id number]  Admission Date: 3/2/2025  Attending Physician: Jame Albrecht MD  Primary Care Provider: Shelley, Primary Doctor    Patient information was obtained from patient, relative(s), and ER records.     Subjective:     Chief Complaint:   Chief Complaint   Patient presents with    Abdominal Pain     Transfer from [place] for appendicitis. Accepted by Dr. Albrecht        History of Present Illness: 34-year-old female patient with a history of abdominal pain with onset this morning (roughly 12 hours previously.  She reports multiple episodes of nausea and emesis.  Pain was located in the central aspect of the abdomen, periumbilical location, and has essentially remained there, though has migrated toward the pelvis some.  She denies fever or chills, but does have a faint feeling.  She reports that was seen in the ER in [facility] and had a workup that revealed a dilated and fluid-filled appendix without definite surrounding inflammatory change.  She also has a white blood cell count [id number].  She was sent here for evaluation and management of presumptive appendicitis.    Current Facility-Administered Medications on File Prior to Encounter   Medication    [COMPLETED] HYDROmorphone injection 1 mg    [COMPLETED] iopamidoL (ISOVUE-370) injection 100 mL    [COMPLETED] ondansetron injection 4 mg    [COMPLETED] sodium chloride 0.9% bolus 1,000 mL 1,000 mL    [DISCONTINUED] piperacillin-tazobactam (ZOSYN) 4.5 g in D5W 100 mL IVPB (MB+)     Current Outpatient Medications on File Prior to Encounter   Medication Sig    HYDROcodone-acetaminophen (NORCO) 5-325 mg per tablet Take 1 tablet by mouth every 4 (four) hours as needed for Pain.    ondansetron (ZOFRAN) 4 MG tablet Take 1 tablet (4 mg total) by mouth every 6 (six) hours.    tamsulosin (FLOMAX) 0.4 mg Cap Take 1 capsule (0.4 mg total) by mouth once daily.  for 10 days       Review of patient's allergies indicates:  No Known Allergies    History reviewed. No pertinent past medical history.  History reviewed. No pertinent surgical history.  Family History    None       Tobacco Use    Smoking status: Some Days     Types: Cigarettes     Passive exposure: Current    Smokeless tobacco: Never   Substance and Sexual Activity    Alcohol use: Not Currently    Drug use: Never    Sexual activity: Yes     Review of Systems   All other systems reviewed and are negative.    Objective:     Vital Signs (Most Recent):  Temp: 98 °F (36.7 °C) (03/02/25 2110)  Pulse: 84 (03/02/25 2110)  Resp: 18 (03/02/25 2110)  BP: (!) 101/58 (03/02/25 2110)  SpO2: 99 % (03/02/25 2110) Vital Signs (24h Range):  Temp:  [97.7 °F (36.5 °C)-98.7 °F (37.1 °C)] 98 °F (36.7 °C)  Pulse:  [67-84] 84  Resp:  [16-20] 18  SpO2:  [97 %-99 %] 99 %  BP: (101-131)/(58-85) 101/58     Weight: 67.2 kg (148 lb 2.4 oz)  Body mass index is 28.93 kg/m².     Physical Exam  Constitutional:       Appearance: She is obese.   Eyes:      General: No scleral icterus.  Cardiovascular:      Heart sounds: Normal heart sounds.   Pulmonary:      Effort: Pulmonary effort is normal.      Breath sounds: Normal breath sounds.   Abdominal:      Palpations: Abdomen is soft. There is no mass.      Tenderness: There is no abdominal tenderness.      Hernia: No hernia is present.      Comments: Bilateral lower quadrant tenderness and epigastric tenderness; +psoas sign, - obturator sign   Musculoskeletal:         General: No swelling.      Right lower leg: No edema.   Skin:     General: Skin is warm.   Neurological:      General: No focal deficit present.      Motor: No weakness.   Psychiatric:         Mood and Affect: Mood normal.            I have reviewed all pertinent lab results within the past 24 hours.  CBC:   Recent Labs   Lab 03/02/25  1709   WBC 19.62*   RBC 4.18*   HGB 13.0   HCT 39.3      MCV 94.0   MCH 31.1*   MCHC 33.1     BMP:    Recent Labs   Lab 03/02/25  1709   *   *   K 3.3*      CO2 21*   BUN 10   CREATININE 0.65   CALCIUM 8.8     CMP:   Recent Labs   Lab 03/02/25  1709   *   CALCIUM 8.8   ALBUMIN 3.9   PROT 6.4   *   K 3.3*   CO2 21*      BUN 10   CREATININE 0.65   ALKPHOS 72   ALT 18   AST 38*   BILITOT 0.5       Significant Diagnostics:  I have reviewed all pertinent imaging results/findings within the past 24 hours.  CT: I have reviewed all pertinent results/findings within the past 24 hours and my personal findings are:  dilated, fluid filled appendix with an appendicolith    Assessment/Plan:     Acute appendicitis  Discussed proceeding urgently to the operating room for laparoscopic appendectomy.  The risks and benefits of surgery were discussed with the patient to include infection, bleeding, hernia, open conversion,  appendix stump leak, abscess, the possible need for further surgery or procedures.  Risks of anesthesia to include cardiac and pulmonary complications were also discussed.  The patient expresses understanding and wishes to proceed.       VTE Risk Mitigation (From admission, onward)      None            Jame Albrecht MD  General Surgery  [facility] - Emergency Department

## 2025-03-03 NOTE — HPI
34-year-old female patient with a history of abdominal pain with onset this morning (roughly 12 hours previously.  She reports multiple episodes of nausea and emesis.  Pain was located in the central aspect of the abdomen, periumbilical location, and has essentially remained there, though has migrated toward the pelvis some.  She denies fever or chills, but does have a faint feeling.  She reports that was seen in the ER in [facility] and had a workup that revealed a dilated and fluid-filled appendix without definite surrounding inflammatory change.  She also has a white blood cell count [id number].  She was sent here for evaluation and management of presumptive appendicitis.

## 2025-03-03 NOTE — ANESTHESIA PROCEDURE NOTES
Intubation    Date/Time: 3/2/2025 10:18 PM    Performed by: Neftaly Marshall CRNA  Authorized by: Humble Mathis MD    Intubation:     Induction:  Intravenous    Intubated:  Postinduction    Mask Ventilation:  Easy mask    Attempts:  1    Attempted By:  CRNA    Method of Intubation:  Direct    Blade:  Olga Lidia 3    Laryngeal View Grade: Grade I - full view of cords      Difficult Airway Encountered?: No      Complications:  None    Airway Device:  Oral endotracheal tube    Airway Device Size:  7.0    Style/Cuff Inflation:  Cuffed (inflated to minimal occlusive pressure)    Tube secured:  20    Secured at:  The lips    Placement Verified By:  Capnometry and Revisualization with laryngoscopy    Complicating Factors:  None    Findings Post-Intubation:  BS equal bilateral and atraumatic/condition of teeth unchanged

## 2025-03-03 NOTE — SUBJECTIVE & OBJECTIVE
Current Facility-Administered Medications on File Prior to Encounter   Medication    [COMPLETED] HYDROmorphone injection 1 mg    [COMPLETED] iopamidoL (ISOVUE-370) injection 100 mL    [COMPLETED] ondansetron injection 4 mg    [COMPLETED] sodium chloride 0.9% bolus 1,000 mL 1,000 mL    [DISCONTINUED] piperacillin-tazobactam (ZOSYN) 4.5 g in D5W 100 mL IVPB (MB+)     Current Outpatient Medications on File Prior to Encounter   Medication Sig    HYDROcodone-acetaminophen (NORCO) 5-325 mg per tablet Take 1 tablet by mouth every 4 (four) hours as needed for Pain.    ondansetron (ZOFRAN) 4 MG tablet Take 1 tablet (4 mg total) by mouth every 6 (six) hours.    tamsulosin (FLOMAX) 0.4 mg Cap Take 1 capsule (0.4 mg total) by mouth once daily. for 10 days       Review of patient's allergies indicates:  No Known Allergies    History reviewed. No pertinent past medical history.  History reviewed. No pertinent surgical history.  Family History    None       Tobacco Use    Smoking status: Some Days     Types: Cigarettes     Passive exposure: Current    Smokeless tobacco: Never   Substance and Sexual Activity    Alcohol use: Not Currently    Drug use: Never    Sexual activity: Yes     Review of Systems   All other systems reviewed and are negative.    Objective:     Vital Signs (Most Recent):  Temp: 98 °F (36.7 °C) (03/02/25 2110)  Pulse: 84 (03/02/25 2110)  Resp: 18 (03/02/25 2110)  BP: (!) 101/58 (03/02/25 2110)  SpO2: 99 % (03/02/25 2110) Vital Signs (24h Range):  Temp:  [97.7 °F (36.5 °C)-98.7 °F (37.1 °C)] 98 °F (36.7 °C)  Pulse:  [67-84] 84  Resp:  [16-20] 18  SpO2:  [97 %-99 %] 99 %  BP: (101-131)/(58-85) 101/58     Weight: 67.2 kg (148 lb 2.4 oz)  Body mass index is 28.93 kg/m².     Physical Exam  Constitutional:       Appearance: She is obese.   Eyes:      General: No scleral icterus.  Cardiovascular:      Heart sounds: Normal heart sounds.   Pulmonary:      Effort: Pulmonary effort is normal.      Breath sounds: Normal  breath sounds.   Abdominal:      Palpations: Abdomen is soft. There is no mass.      Tenderness: There is no abdominal tenderness.      Hernia: No hernia is present.      Comments: Bilateral lower quadrant tenderness and epigastric tenderness; +psoas sign, - obturator sign   Musculoskeletal:         General: No swelling.      Right lower leg: No edema.   Skin:     General: Skin is warm.   Neurological:      General: No focal deficit present.      Motor: No weakness.   Psychiatric:         Mood and Affect: Mood normal.            I have reviewed all pertinent lab results within the past 24 hours.  CBC:   Recent Labs   Lab 03/02/25  1709   WBC 19.62*   RBC 4.18*   HGB 13.0   HCT 39.3      MCV 94.0   MCH 31.1*   MCHC 33.1     BMP:   Recent Labs   Lab 03/02/25  1709   *   *   K 3.3*      CO2 21*   BUN 10   CREATININE 0.65   CALCIUM 8.8     CMP:   Recent Labs   Lab 03/02/25  1709   *   CALCIUM 8.8   ALBUMIN 3.9   PROT 6.4   *   K 3.3*   CO2 21*      BUN 10   CREATININE 0.65   ALKPHOS 72   ALT 18   AST 38*   BILITOT 0.5       Significant Diagnostics:  I have reviewed all pertinent imaging results/findings within the past 24 hours.  CT: I have reviewed all pertinent results/findings within the past 24 hours and my personal findings are:  dilated, fluid filled appendix with an appendicolith

## 2025-03-03 NOTE — ED PROVIDER NOTES
Encounter Date: 3/2/2025    SCRIBE #1 NOTE: I, Olivia Denise, am scribing for, and in the presence of,  Bala Ro MD. I have scribed the entire note.       History     Chief Complaint   Patient presents with    Abdominal Pain     Transfer from [place] for appendicitis. Accepted by Dr. Albrecht      This is a 35 y/o  female presents to the ED via EMS  with c/o right flank pain, abdominal pain, and nausea/vomiting x 5 with onset this morning. She  locates abdominal pain as generalized and rates pain 7/10.  She last had BM today with normal stool. She denies any fever/chills, diarrhea, or constipation.She was transported to the ED from [place] ED after she had a CT of the Abdomen which showed:The appendix is distended with fluid, luminal diameter measuring up to 1.1 cm noting distal appendicoliths. Dr. Albrecht is at bedside at the time of the exam.     The history is provided by the patient, the EMS personnel, medical records and a relative. The history is limited by a language barrier. A  was used.     Review of patient's allergies indicates:  No Known Allergies  History reviewed. No pertinent past medical history.  Past Surgical History:   Procedure Laterality Date    LAPAROSCOPIC APPENDECTOMY N/A 3/2/2025    Procedure: APPENDECTOMY, LAPAROSCOPIC;  Surgeon: Jame Albrecht MD;  Location: [facility];  Service: General;  Laterality: N/A;     No family history on file.  Social History[1]  Review of Systems   Constitutional:  Positive for appetite change.   Gastrointestinal:  Positive for abdominal pain and nausea.   All other systems reviewed and are negative.      Physical Exam     Initial Vitals [03/02/25 2110]   BP Pulse Resp Temp SpO2   (!) 101/58 84 18 98 °F (36.7 °C) 99 %      MAP       --         Physical Exam    Nursing note and vitals reviewed.  Constitutional: She appears well-developed and well-nourished.   HENT:   Head: Normocephalic and atraumatic.   Nose: Nose normal.  Mouth/Throat: Oropharynx is clear and moist.   Eyes: Conjunctivae and EOM are normal. Pupils are equal, round, and reactive to light.   Neck: Neck supple.   Normal range of motion.  Cardiovascular:  Normal rate, regular rhythm and normal heart sounds.           Pulmonary/Chest: Breath sounds normal.   Abdominal: Abdomen is soft. Bowel sounds are normal.   Musculoskeletal:         General: Normal range of motion.      Cervical back: Normal range of motion and neck supple.     Neurological: She is alert and oriented to person, place, and time. She has normal strength.   Skin: Skin is warm and dry. Capillary refill takes less than 2 seconds.   Psychiatric: She has a normal mood and affect.         ED Course   Procedures  Labs Reviewed   POCT GLUCOSE MONITORING CONTINUOUS - Abnormal       Result Value    POC Glucose 126 (*)           Imaging Results    None          Medications   meperidine (PF) injection 25 mg (has no administration in time range)   enoxaparin injection 40 mg (40 mg Subcutaneous Given 3/3/25 1637)   morphine injection 4 mg (has no administration in time range)   morphine injection 4 mg (has no administration in time range)   ondansetron injection 4 mg (has no administration in time range)   melatonin tablet 6 mg (has no administration in time range)   acetaminophen tablet 650 mg (has no administration in time range)   cloNIDine tablet 0.1 mg (has no administration in time range)   dextrose 5 % and 0.45 % NaCl infusion ( Intravenous New Bag 3/4/25 6811)   HYDROcodone-acetaminophen 7.5-325 mg per tablet 1 tablet (1 tablet Oral Given 3/3/25 0912)     Medical Decision Making  RLQ ABDOMINAL PAIN.  CT AT [facility] READ AS APPENDICITIS.    DDX:  APPENDICITIS VS MESENTERIC ADENITIS VS OTHER    PATIENT GOING TO OR...    Amount and/or Complexity of Data Reviewed  Discussion of management or test interpretation with external provider(s): DISCUSSED WITH ADMITTING SERVICE.              Attending Attestation:            Physician Attestation for Scribe:  Physician Attestation Statement for Scribe #1: I, Bala Ro MD, reviewed documentation, as scribed by Olivia Cali in my presence, and it is both accurate and complete.                                    Clinical Impression:  Final diagnoses:  [K35.80] Acute appendicitis, unspecified acute appendicitis type          ED Disposition Condition    Admit Stable                  [1]   Social History  Tobacco Use    Smoking status: Some Days     Types: Cigarettes     Passive exposure: Current    Smokeless tobacco: Never   Substance Use Topics    Alcohol use: Not Currently    Drug use: Never        Bala Ro MD  03/04/25 1146

## 2025-03-03 NOTE — OP NOTE
[facility] - Peri Services  General Surgery  Operative Note        Date of Procedure: 3/2/2025     Procedure: Procedure(s) (LRB):  APPENDECTOMY, LAPAROSCOPIC (N/A)       Surgeons and Role:     * Jame Albrecht MD - Primary    Assisting Surgeon: None    Pre-Operative Diagnosis: Acute appendicitis with localized peritonitis, unspecified whether abscess present, unspecified whether gangrene present, unspecified whether perforation present [K35.30]    Post-Operative Diagnosis: Post-Op Diagnosis Codes:     * Acute appendicitis with localized peritonitis, unspecified whether abscess present, unspecified whether gangrene present, unspecified whether perforation present [K35.30]    Anesthesia: General    Operative Findings (including complications, if any): distended appendix with early acute changes, nonperforated    Description of Technical Procedures: The patient was brought to the operating room and placed in supine position. General anesthesia was administered. The patient's abdomen was prepped and draped in standard sterile fashion.  Supraumbilical incision was performed a #11 blade and dissection was carried down through subcutaneous tissue to the level of the fascia. Fascia was sharply transected. Peritoneum was carefully entered. Christiano cannula was inserted and CO2 pneumoperitoneum was established. 5 mm trochars were then placed in the right lateral abdomen and right upper quadrant under direct visualization. The appendix was identified and was mildly inflamed, without perforation. There was no other pathology noted. The mesoappendix was transected with the LigaSure device.appendix was dissected back to the cecum, transecting fat and peritoneal attachments with the Ligasure.  Following this, the base of the appendix was transected with the endoscopic stapler. The right lower quadrant was irrigated. All port sites were infiltrated with local at the peritoneal, fascial and  the skin level. The appendix  was removed through the supraumbilical port, inside an Endocatch bag.  CO2 pneumoperitoneum was allowed to escape. The fascia of the umbilical incision was closed using interrupted PDS. All port sites were closed at skin level with subcuticular Vicryl. Steri-Strips were placed and sterile dressings were applied. The patient was awakened from anesthesia in stable condition.       Estimated Blood Loss (EBL): 5 mL             Specimens: Appendix to pathology  Specimen (24h ago, onward)       Start     Ordered    03/02/25 2241  Surgical Pathology  RELEASE UPON ORDERING         03/02/25 2241                            Condition: Good    Disposition: PACU - hemodynamically stable.

## 2025-03-03 NOTE — ANESTHESIA PREPROCEDURE EVALUATION
03/02/2025  Lacey Huber is a 34 y.o., female.      Pre-op Assessment    I have reviewed the Patient Summary Reports.    I have reviewed the NPO Status.   I have reviewed the Medications.     Review of Systems         Anesthesia Plan  Type of Anesthesia, risks & benefits discussed:    Anesthesia Type: Gen ETT  Intra-op Monitoring Plan: Standard ASA Monitors  Post Op Pain Control Plan: IV/PO Opioids PRN  Induction:  IV  Informed Consent: Informed consent signed with the Patient and all parties understand the risks and agree with anesthesia plan.  All questions answered.   ASA Score: 2    Ready For Surgery From Anesthesia Perspective.     .  NPO greater than 8 hours  No anesthetic complications  Allergic to seafood    Hct 39  K 3.3  HCG negative    Acute appendicitis    MP III; adequate ROM at neck

## 2025-03-03 NOTE — OR NURSING
2319: Pt back from surgery on 6 SMF and  airway in place.. VSS, Neuro intact.ABD lap punc x3 with island border, dressing C/D/I. Pt has no CC at this time.    2332: Notified son Via telephone,family in ICU waiting room.    2343: Spoke with 5N about transfer to room 461.    2346: Pt place on RA, VSS, Pt has no CC at this time.     0005: Pt transferred to room 461. VSS, O2 96% on RA, HR 86, /68,  RR 18  . Neuro intact. ABD lap punc x3 with island/border C/D/I.  Family at bedside. Report given to CRISTOABL Bustos. Care transferred at this time.

## 2025-03-03 NOTE — TRANSFER OF CARE
Anesthesia Transfer of Care Note    Patient: Lacey Huber    Procedure(s) Performed: Procedure(s) (LRB):  APPENDECTOMY, LAPAROSCOPIC (N/A)    Patient location: PACU    Anesthesia Type: general    Transport from OR: Transported from OR on 6-10 L/min O2 by face mask with adequate spontaneous ventilation    Post pain: adequate analgesia    Post assessment: no apparent anesthetic complications    Post vital signs: stable    Level of consciousness: responds to stimulation, awake and sedated    Nausea/Vomiting: no nausea/vomiting    Complications: none    Transfer of care protocol was followed    Last vitals: Visit Vitals  BP (!) 103/49 (BP Location: Right arm, Patient Position: Lying)   Pulse 80   Temp 36.7 °C (98 °F) (Oral)   Resp 13   Ht 5' (1.524 m)   Wt 67.2 kg (148 lb 2.4 oz)   LMP 02/23/2025   SpO2 97%   Breastfeeding No   BMI 28.93 kg/m²     
04-Mar-2024 16:56

## 2025-03-03 NOTE — PLAN OF CARE
Prior to going to OR pt removed black heart earrings, 1 silver ring and 1 red stone bracelet while in ED. Pt was also covered with blue fleece blanket/robe.  All pt belongings including jewelry and fleece covering were left with pt son.

## 2025-03-04 VITALS
BODY MASS INDEX: 29.08 KG/M2 | RESPIRATION RATE: 18 BRPM | DIASTOLIC BLOOD PRESSURE: 61 MMHG | HEIGHT: 60 IN | TEMPERATURE: 98 F | WEIGHT: 148.13 LBS | OXYGEN SATURATION: 97 % | HEART RATE: 84 BPM | SYSTOLIC BLOOD PRESSURE: 92 MMHG

## 2025-03-04 LAB
BASOPHILS # BLD AUTO: 0.04 K/UL (ref 0–0.2)
BASOPHILS NFR BLD AUTO: 0.3 % (ref 0–1)
DIFFERENTIAL METHOD BLD: ABNORMAL
EOSINOPHIL # BLD AUTO: 0.03 K/UL (ref 0–0.5)
EOSINOPHIL NFR BLD AUTO: 0.2 % (ref 1–4)
ERYTHROCYTE [DISTWIDTH] IN BLOOD BY AUTOMATED COUNT: 11.8 % (ref 11.5–14.5)
ESTROGEN SERPL-MCNC: NORMAL PG/ML
HCT VFR BLD AUTO: 35.2 % (ref 38–47)
HGB BLD-MCNC: 11.8 G/DL (ref 12–16)
IMM GRANULOCYTES # BLD AUTO: 0.07 K/UL (ref 0–0.04)
IMM GRANULOCYTES NFR BLD: 0.4 % (ref 0–0.4)
INSULIN SERPL-ACNC: NORMAL U[IU]/ML
LAB AP GROSS DESCRIPTION: NORMAL
LAB AP LABORATORY NOTES: NORMAL
LYMPHOCYTES # BLD AUTO: 2.68 K/UL (ref 1–4.8)
LYMPHOCYTES NFR BLD AUTO: 17.2 % (ref 27–41)
MCH RBC QN AUTO: 31.6 PG (ref 27–31)
MCHC RBC AUTO-ENTMCNC: 33.5 G/DL (ref 32–36)
MCV RBC AUTO: 94.1 FL (ref 80–96)
MONOCYTES # BLD AUTO: 1.06 K/UL (ref 0–0.8)
MONOCYTES NFR BLD AUTO: 6.8 % (ref 2–6)
MPC BLD CALC-MCNC: 9.3 FL (ref 9.4–12.4)
NEUTROPHILS # BLD AUTO: 11.74 K/UL (ref 1.8–7.7)
NEUTROPHILS NFR BLD AUTO: 75.1 % (ref 53–65)
NRBC # BLD AUTO: 0 X10E3/UL
NRBC, AUTO (.00): 0 %
PLATELET # BLD AUTO: 340 K/UL (ref 150–400)
RBC # BLD AUTO: 3.74 M/UL (ref 4.2–5.4)
T3RU NFR SERPL: NORMAL %
WBC # BLD AUTO: 15.62 K/UL (ref 4.5–11)

## 2025-03-04 PROCEDURE — 85025 COMPLETE CBC W/AUTO DIFF WBC: CPT | Performed by: SURGERY

## 2025-03-04 PROCEDURE — 25000003 PHARM REV CODE 250: Performed by: SURGERY

## 2025-03-04 PROCEDURE — 36415 COLL VENOUS BLD VENIPUNCTURE: CPT | Performed by: SURGERY

## 2025-03-04 PROCEDURE — S5010 5% DEXTROSE AND 0.45% SALINE: HCPCS | Performed by: SURGERY

## 2025-03-04 RX ORDER — HYDROCODONE BITARTRATE AND ACETAMINOPHEN 7.5; 325 MG/1; MG/1
1 TABLET ORAL EVERY 6 HOURS PRN
Qty: 16 TABLET | Refills: 0 | Status: SHIPPED | OUTPATIENT
Start: 2025-03-04

## 2025-03-04 RX ORDER — MEDROXYPROGESTERONE ACETATE 150 MG/ML
150 INJECTION, SUSPENSION INTRAMUSCULAR
COMMUNITY
Start: 2024-12-10

## 2025-03-04 RX ADMIN — DEXTROSE AND SODIUM CHLORIDE: 5; 450 INJECTION, SOLUTION INTRAVENOUS at 04:03

## 2025-03-04 NOTE — ANESTHESIA POSTPROCEDURE EVALUATION
Anesthesia Post Evaluation    Patient: Lacey Huber    Procedure(s) Performed: Procedure(s) (LRB):  APPENDECTOMY, LAPAROSCOPIC (N/A)    Final Anesthesia Type: general      Patient location during evaluation: PACU  Post-procedure vital signs: reviewed and stable  Pain management: adequate  Airway patency: patent    PONV status at discharge: No PONV  Anesthetic complications: no      Cardiovascular status: hemodynamically stable  Respiratory status: unassisted  Hydration status: euvolemic  Follow-up not needed.              Vitals Value Taken Time   BP 92/61 03/04/25 11:37   Temp 36.6 °C (97.8 °F) 03/04/25 11:37   Pulse 84 03/04/25 11:37   Resp 18 03/04/25 11:37   SpO2 97 % 03/04/25 11:37         Event Time   Out of Recovery 23:55:00         Pain/Tucker Score: Pain Rating Prior to Med Admin: 4 (3/3/2025  9:12 AM)  Pain Rating Post Med Admin: 3 (3/3/2025 10:12 AM)  Tucker Score: 9 (3/3/2025 12:11 AM)

## 2025-03-04 NOTE — PLAN OF CARE
Problem: Adult Inpatient Plan of Care  Goal: Plan of Care Review  Outcome: Progressing  Goal: Optimal Comfort and Wellbeing  Outcome: Progressing     Problem: Wound  Goal: Absence of Infection Signs and Symptoms  Outcome: Progressing  Goal: Optimal Pain Control and Function  Outcome: Progressing  Goal: Skin Health and Integrity  Outcome: Progressing     Problem: Fall Injury Risk  Goal: Absence of Fall and Fall-Related Injury  Outcome: Progressing

## 2025-03-04 NOTE — PROGRESS NOTES
[facility] - Orthopedic  Wound Care    Patient Name:  Lacey Huber   MRN:  [id number]  Date: 3/4/2025  Diagnosis: Acute appendicitis    History:     History reviewed. No pertinent past medical history.    Social History[1]    Precautions:     Allergies as of 03/02/2025    (No Known Allergies)       [facility] Assessment Details/Treatment     Narrative: Seen patient for initiation of preventative skin care measures    Patient in bed, Alert. States skin is okay.  Lewis score 19    Consult wound care for any skin issues         03/04/2025         [1]   Social History  Socioeconomic History    Marital status: Single   Tobacco Use    Smoking status: Some Days     Types: Cigarettes     Passive exposure: Current    Smokeless tobacco: Never   Substance and Sexual Activity    Alcohol use: Not Currently    Drug use: Never    Sexual activity: Yes     Social Drivers of Health     Financial Resource Strain: Patient Unable To Answer (3/3/2025)    Overall Financial Resource Strain (CARDIA)     Difficulty of Paying Living Expenses: Patient unable to answer   Food Insecurity: Patient Unable To Answer (3/3/2025)    Hunger Vital Sign     Worried About Running Out of Food in the Last Year: Patient unable to answer     Ran Out of Food in the Last Year: Patient unable to answer   Stress: Patient Unable To Answer (3/3/2025)    [employer] [place] [employer] - Occupational Stress Questionnaire     Feeling of Stress : Patient unable to answer   Housing Stability: Patient Unable To Answer (3/3/2025)    Housing Stability Vital Sign     Unable to Pay for Housing in the Last Year: Patient unable to answer     Homeless in the Last Year: Patient unable to answer

## 2025-03-04 NOTE — PROGRESS NOTES
No c/o; feels much better  Tolerating liquids, without nausea, and hungry; has had flatus, but no BM yet     No fever, vitals good  Abd appropriately tender    D/c home tomorrow if tolerating diet well

## 2025-03-19 ENCOUNTER — OFFICE VISIT (OUTPATIENT)
Dept: SURGERY | Facility: CLINIC | Age: 35
End: 2025-03-19
Attending: SURGERY
Payer: COMMERCIAL

## 2025-03-19 DIAGNOSIS — Z09 POSTOP CHECK: Primary | ICD-10-CM

## 2025-03-19 PROCEDURE — 99212 OFFICE O/P EST SF 10 MIN: CPT | Mod: PBBFAC | Performed by: SURGERY

## 2025-03-19 PROCEDURE — 99024 POSTOP FOLLOW-UP VISIT: CPT | Mod: ,,, | Performed by: SURGERY

## 2025-03-19 PROCEDURE — 99999 PR PBB SHADOW E&M-EST. PATIENT-LVL II: CPT | Mod: PBBFAC,,, | Performed by: SURGERY

## 2025-03-19 PROCEDURE — 1159F MED LIST DOCD IN RCRD: CPT | Mod: ,,, | Performed by: SURGERY

## 2025-03-19 NOTE — LETTER
March 19, 2025      [facility] - General Surgery  [address]  [facility] [address]  Phone: [phone]  Fax: [phone]       Patient: Lacey Huber   YOB: 1990  Date of Visit: 03/19/2025    To Whom It May Concern:    Nancy Huber  was at [facility] on 3/8/25. The patient may return to work on 3/21/25 with restrictions. If you have any questions or concerns, or if I can be of further assistance, please do not hesitate to contact me.    Sincerely,    Jame Albrecht M.D.

## 2025-03-19 NOTE — LETTER
March 19, 2025      [facility]  [address]  [facility] [address]  Phone: [phone]  Fax: [phone]       Patient: Lacey Huber   YOB: 1990  Date of Visit: 03/19/2025    To Whom It May Concern:    Nancy Huber  was at [facility] on 3/2/25. The patient may return to work on 3/26/25 with restrictions of no lifting for 6 weeks. If you have any questions or concerns, or if I can be of further assistance, please do not hesitate to contact me.    Sincerely,    Jame Albrecht M.D.

## 2025-03-20 NOTE — PROGRESS NOTES
Status post lap appy.  Patient reports she is doing well.  She reports mild amount of discomfort at the umbilical incision, but it is tolerable.  She is eating well, and having no issues with bowel movements.    Incisions have all healed nicely.    Patient will follow up with me PRN.

## 2025-03-26 ENCOUNTER — TELEPHONE (OUTPATIENT)
Dept: SURGERY | Facility: CLINIC | Age: 35
End: 2025-03-26
Payer: COMMERCIAL

## 2025-03-26 NOTE — TELEPHONE ENCOUNTER
----- Message from Sherwin sent at 3/26/2025 11:18 AM CDT -----  Regarding: work excuse  [employer] in [place] called and asked could an updated excuse be sent saying pt can return back to work April 1 to fax number [phone]

## 2025-03-26 NOTE — LETTER
March 26, 2025      [facility]  [address]  [facility] [address]  Phone: [phone]  Fax: [phone]       Patient: Lacey Huber   YOB: 1990  Date of Visit: 03/26/2025    To Whom It May Concern:    Nancy Huber  was at [facility] on 3/2/25. The patient may return to work on 4/1/25 with no restrictions. If you have any questions or concerns, or if I can be of further assistance, please do not hesitate to contact me.    Sincerely,    Jame Albrecht M.D.

## 2025-03-26 NOTE — LETTER
March 26, 2025      [facility]  [address]  [facility] [address]  Phone: [phone]  Fax: [phone]       Patient: Lacey Huber   YOB: 1990  Date of Visit: 03/26/2025    To Whom It May Concern:    Nancy Huber  was at [facility] on 3/2/25. The patient may return to work on 4/1/25 with no restrictions. If you have any questions or concerns, or if I can be of further assistance, please do not hesitate to contact me.    Sincerely,    Jame Albrecht M.D.

## (undated) DEVICE — R SEALER LIGASURE MARYLND 37CM

## (undated) DEVICE — CANNULA LAP SEAL Z THRD 5X100

## (undated) DEVICE — WARMER BLUE HEAT SCOPE 3-12MM

## (undated) DEVICE — SUT PDS II O CT-2 VIL MONO

## (undated) DEVICE — SYR 10CC LUER LOCK

## (undated) DEVICE — GLOVE SENSICARE PI GRN 6.5

## (undated) DEVICE — SUT VICRYL PLUS 4-0 FS-2 27IN

## (undated) DEVICE — GOWN NONREINF SET-IN SLV 2XL

## (undated) DEVICE — STAPLER INT LINEAR ARTC 3.5-45

## (undated) DEVICE — GLOVE SENSICARE PI SURG 6.5

## (undated) DEVICE — TRAY CATH 1-LYR URIMTR 16FR

## (undated) DEVICE — GLOVE SENSICARE PI SURG 6

## (undated) DEVICE — CLOSURE SKIN STERI STRIP 1/2X4

## (undated) DEVICE — Device

## (undated) DEVICE — GLOVE SENSICARE PI SURG 8

## (undated) DEVICE — TROCAR KII BLLN 12MM 10CM

## (undated) DEVICE — TROCAR ENDO Z THREAD KII 5X100

## (undated) DEVICE — BAG TISS RETRV MONARCH 10MM

## (undated) DEVICE — CART STAPLE FLEX ETX 3.5MM BLU

## (undated) DEVICE — GLOVE SENSICARE PI GRN 8